# Patient Record
Sex: MALE | Race: WHITE | NOT HISPANIC OR LATINO | Employment: FULL TIME | URBAN - METROPOLITAN AREA
[De-identification: names, ages, dates, MRNs, and addresses within clinical notes are randomized per-mention and may not be internally consistent; named-entity substitution may affect disease eponyms.]

---

## 2017-01-24 ENCOUNTER — OFFICE VISIT (OUTPATIENT)
Dept: NEUROLOGY | Facility: CLINIC | Age: 77
End: 2017-01-24
Payer: COMMERCIAL

## 2017-01-24 VITALS
HEART RATE: 82 BPM | SYSTOLIC BLOOD PRESSURE: 124 MMHG | BODY MASS INDEX: 21.87 KG/M2 | WEIGHT: 165.81 LBS | DIASTOLIC BLOOD PRESSURE: 82 MMHG

## 2017-01-24 DIAGNOSIS — G91.2 NORMAL PRESSURE HYDROCEPHALUS SYNDROME: Primary | ICD-10-CM

## 2017-01-24 DIAGNOSIS — I10 ESSENTIAL HYPERTENSION: ICD-10-CM

## 2017-01-24 PROCEDURE — 1157F ADVNC CARE PLAN IN RCRD: CPT | Mod: S$GLB,,, | Performed by: PSYCHIATRY & NEUROLOGY

## 2017-01-24 PROCEDURE — 1159F MED LIST DOCD IN RCRD: CPT | Mod: S$GLB,,, | Performed by: PSYCHIATRY & NEUROLOGY

## 2017-01-24 PROCEDURE — 3074F SYST BP LT 130 MM HG: CPT | Mod: S$GLB,,, | Performed by: PSYCHIATRY & NEUROLOGY

## 2017-01-24 PROCEDURE — 1160F RVW MEDS BY RX/DR IN RCRD: CPT | Mod: S$GLB,,, | Performed by: PSYCHIATRY & NEUROLOGY

## 2017-01-24 PROCEDURE — 3079F DIAST BP 80-89 MM HG: CPT | Mod: S$GLB,,, | Performed by: PSYCHIATRY & NEUROLOGY

## 2017-01-24 PROCEDURE — 99999 PR PBB SHADOW E&M-EST. PATIENT-LVL II: CPT | Mod: PBBFAC,,, | Performed by: PSYCHIATRY & NEUROLOGY

## 2017-01-24 PROCEDURE — 99215 OFFICE O/P EST HI 40 MIN: CPT | Mod: S$GLB,,, | Performed by: PSYCHIATRY & NEUROLOGY

## 2017-01-24 PROCEDURE — 1126F AMNT PAIN NOTED NONE PRSNT: CPT | Mod: S$GLB,,, | Performed by: PSYCHIATRY & NEUROLOGY

## 2017-01-24 NOTE — PROGRESS NOTES
"A ventriculoperitoneal shunt for normal pressure hydrocephalus.  The patient's primary symptoms prior to the shunt procedure for gait disturbance, cognitive difficulty, and urinary urgency with occasional incontinence.  Following the shunt, he had done well until unfortunately he had a separate neurological event, a small cerebral hemorrhage the did not appear to be related to the shunt placement.    The patient and his wife return today indicating that since the stroke and following a period of rehabilitation for 3 weeks he was doing extremely well.  However in late December he developed a sudden sensation of weakness leaning to the left side and was unable to ambulate without help.  Since that time, he has continued to have the significant gait difficulty leaning to the left when seated.    The patient's wife indicates that he was seen by his neurosurgeon and a CT scan of the brain was done which seem to indicate the accumulation of a small subdural hematoma.  His shunt pressures were adjusted but his balance and walking did not change.  The patient has subsequent have a follow-up CT scan of the brain which seem to indicate resolution of the subdural fluid accumulation.  A follow-up CT scan also showed the cerebral hemorrhage that have been present for to be regressing in size.    The patient's wife however has become extremely frustrated because the patient continues to have periods of transient improvement and then periods of extreme worsening to the point that his gait and balance fluctuates a great deal.  He is now utilizing a walker most of the time for balance purposes.  The patient's wife is concerned because she feels that his cognitive abilities are also waxing and waning frequently.  She also feels that his "hydrocephalus symptoms" seem to come and go and fluctuate a certain extent as well.  As an example, he has developed extreme urinary frequency with extreme nocturia.  The patient seems to have " difficulty with recent recall.  The patient's wife indicates that he continues to favor his left side a great deal.  He is not had any complaint of severe headache with nausea or vomiting however.  He is not aware of any vision disturbance.  Wife indicates that they do have a follow-up visit with the neurosurgeon within the next 1-2 days.      ROS:  GENERAL: No fever, chills,  or weight loss.  SKIN: No rashes, itching or changes in color or texture of skin.  HEAD: No  recent head trauma.  EYES: Visual acuity fine. No photophobia, ocular pain or diplopia.  EARS: Denies ear pain, discharge or vertigo.  NOSE: No loss of smell, no epistaxis or postnasal drip.  MOUTH & THROAT: No hoarseness or change in voice. No excessive gum bleeding.  NODES: Denies swollen glands.  CHEST: Denies SEVERINO, cyanosis, wheezing, cough and sputum production.  CARDIOVASCULAR: Denies chest pain, PND, orthopnea or reduced exercise tolerance.  ABDOMEN: Appetite fine. No weight loss. Denies diarrhea, abdominal pain, hematemesis or blood in stool.  URINARY: No flank pain, dysuria or hematuria.  PERIPHERAL VASCULAR: No claudication or cyanosis.  MUSCULOSKELETAL: No joint stiffness or swelling. Denies back pain.  NEUROLOGIC: No history of seizures, or unexplained loss of consciousness.    The patient's past history, family history, and social history were reviewed with the patient, his wife, and the electronic medical record.  I did not have access to the follow-up CT scan brain that has been done.    PE:   VITAL SIGNS: Blood pressure 124/82, pulse 82, weight 75.2 kg  APPEARANCE: Well nourished, well developed, in no acute distress.  The patient was somewhat reserved and quiet during the visit but answered questions appropriately.  He is alert and attentive to his environment.    HEAD: Normocephalic, atraumatic.  EYES: PERRL. EOMI.  Non-icteric sclerae.    EARS: TM's intact. Light reflex normal. No retraction or perforation.    NOSE: Mucosa pink.  Airway clear.  MOUTH & THROAT: No tonsillar enlargement. No pharyngeal erythema or exudate. No stridor.  NECK: Supple. No bruits.  CHEST: Lungs clear to auscultation.  CARDIOVASCULAR: Regular rhythm without significant murmurs.  ABDOMEN: Bowel sounds normal. Not distended.  MUSCULOSKELETAL:  No bony deformity seen.    NEUROLOGIC:   Mental Status:  The patient is well oriented to person, time, place, and situation.  The patient is attentive to the environment and cooperative for the exam.  Cranial Nerves: II-XII grossly intact. Fundoscopic exam is normal.  No hemorrhage, exudate or papilledema is present. The extraocular muscles are intact in the cardinal directions of gaze.  No ptosis is present. Facial features are symmetrical.  Speech is normal in fluency, diction, and phrasing.  Tongue protrudes in the midline.    Gait and Station: The patient was able to come from sit to stand independently but had to utilize the arms of the chair to come to standing position.  He utilizes a front wheel rolling walker for ambulation.  While seated, he had a tendency to lean to the left.  While standing he also had a tendency to lean to the left.  Motor:  No downdrift of either arm when held at shoulder level.  Manual muscle testing of proximal and distal muscles of both upper and lower extremities does not indicate a focal or lateralized motor weakness in either upper or either lower extremity.  Sensory:  Intact both upper and lower extremities to pin prick, touch, and vibration.  Cerebellar:  Finger to nose done well.  However, the patient needed frequent verbal cues to complete the task.  Alternating movements seem more awkward on the left than on the right.  No involuntary movements or tremor seen.  Reflexes:  Stretch reflexes are trace both upper and lower extremities.  Plantar stimulation is flexor bilaterally and no pathological reflexes are seen      ASSESSMENT:  1.  Normal pressure hydrocephalus  2.  History of small  intracerebral bleed    DISCUSSION:  The patient's examination today does she seem to indicate the patient is slightly slower in his answers but is able to follow commands without difficulty although does require some verbal reminders to complete sequential tasks.  His ambulation is independent with his walker.  I do not think that I can prescribe the symptoms to one hour the other of the pathologic processes that is present.  Certainly having his pressures checked again and perhaps adjustment of his shunt would be of some benefit.  A follow-up CT scan to make certain that there is no further accumulation of subdural fluid is also reasonable.  The patient does have a follow-up visit with neurosurgery.  The wife is expressing increasing frustration with the turn of events that occurred.  I have tried to reassure her that the serial hemorrhage will resolve and is doing so and would probably leave little or no significant neurologic deficits.  The patient and his wife will return as needed.    This was a 70 minute visit with the patient and his wife with over 50% of time spent counseling the wife and answering questions that she had regarding the neurologic exam as well as the history.

## 2017-01-30 ENCOUNTER — TELEPHONE (OUTPATIENT)
Dept: NEUROLOGY | Facility: CLINIC | Age: 77
End: 2017-01-30

## 2017-01-30 NOTE — TELEPHONE ENCOUNTER
----- Message from Danni Grossman sent at 1/30/2017  1:44 PM CST -----  Contact: pt wife Isabelle   pt wife Isabelle, is requesting for nurse to call her back about the disability date, please call pt ASAP

## 2017-03-17 ENCOUNTER — OFFICE VISIT (OUTPATIENT)
Dept: OPHTHALMOLOGY | Facility: CLINIC | Age: 77
End: 2017-03-17
Payer: COMMERCIAL

## 2017-03-17 DIAGNOSIS — E11.9 TYPE II OR UNSPECIFIED TYPE DIABETES MELLITUS WITHOUT MENTION OF COMPLICATION, NOT STATED AS UNCONTROLLED: Primary | ICD-10-CM

## 2017-03-17 DIAGNOSIS — H40.009 PREGLAUCOMA, UNSPECIFIED: ICD-10-CM

## 2017-03-17 PROCEDURE — 99999 PR PBB SHADOW E&M-EST. PATIENT-LVL II: CPT | Mod: PBBFAC,,, | Performed by: OPHTHALMOLOGY

## 2017-03-17 PROCEDURE — 92014 COMPRE OPH EXAM EST PT 1/>: CPT | Mod: S$GLB,,, | Performed by: OPHTHALMOLOGY

## 2017-03-17 NOTE — MR AVS SNAPSHOT
[facility]  [address] [place] [address]  Phone: [phone]  Fax: [phone]                  Naun Menendez Jr.   3/17/2017 2:00 PM   Office Visit    Description:  Male : 1940   Provider:  GEMA Hicks MD   Department:  [facility]           Reason for Visit     Diabetic Eye Exam           Diagnoses this Visit        Comments    Type II or unspecified type diabetes mellitus without mention of complication, not stated as uncontrolled    -  Primary            To Do List           Goals (5 Years of Data)     None      [facility]     [facility] Nurse Care [facility] -  Assistance  Registered nurses in the [facility] Center provide clinical advisement, health education, appointment booking, and other advisory services.  Call for this free service at [phone].             Medications           Message regarding Medications     Verify the changes and/or additions to your medication regime listed below are the same as discussed with your clinician today.  If any of these changes or additions are incorrect, please notify your healthcare provider.             Verify that the below list of medications is an accurate representation of the medications you are currently taking.  If none reported, the list may be blank. If incorrect, please contact your healthcare provider. Carry this list with you in case of emergency.           Current Medications     blood sugar diagnostic, drum (ACCU-CHEK COMPACT TEST) Strp     BREO ELLIPTA 200-25 mcg/dose DsDv diskus inhaler     clobetasol (TEMOVATE) 0.05 % cream     fluticasone (FLONASE) 50 mcg/actuation nasal spray     glimepiride (AMARYL) 1 MG tablet     indapamide (LOZOL) 1.25 MG Tab Take 1.25 mg by mouth.    lancets (ACCU-CHEK SOFTCLIX LANCETS) Misc     metformin (GLUCOPHAGE) 500 MG tablet Take 500 mg by mouth 2 (two) times daily with meals.    metformin (GLUCOPHAGE-XR) 500 MG 24 hr tablet     metoprolol succinate  (TOPROL-XL) 100 MG 24 hr tablet     metoprolol succinate (TOPROL-XL) 50 MG 24 hr tablet 100 mg 2 (two) times daily.     nebivolol (BYSTOLIC) 10 MG Tab Take 10 mg by mouth once daily.    olopatadine (PATANOL) 0.1 % ophthalmic solution     omeprazole (PRILOSEC) 20 MG capsule     omeprazole (PRILOSEC) 40 MG capsule     propranolol (INDERAL LA) 120 MG 24 hr capsule Take 120 mg by mouth once daily.    propranolol (INDERAL LA) 120 MG 24 hr capsule     SYNTHROID 100 mcg tablet     testosterone cypionate (DEPOTESTOTERONE CYPIONATE) 200 mg/mL injection INJECT 260 MG. (1.3 MLS) EVERY 10 DAYS    valsartan (DIOVAN) 80 MG tablet Take 80 mg by mouth.           Clinical Reference Information           Allergies as of 3/17/2017     Methylene Blue    Pioglitazone    Streptomycin      Immunizations Administered on Date of Encounter - 3/17/2017     None      [facility] Sign-Up     Activating your [facility] account is as easy as 1-2-3!     1) Visit [email or web address], select Sign Up Now, enter this activation code and your date of birth, then select Next.  Activation code not generated  Current Patient Portal Status: Account disabled      2) Create a username and password to use when you visit [facility] in the future and select a security question in case you lose your password and select Next.    3) Enter your e-mail address and click Sign Up!    Additional Information  If you have questions, please e-mail [email or web address] or call [phone] to talk to our [facility] staff. Remember, [facility] is NOT to be used for urgent needs. For medical emergencies, dial 911.         Language Assistance Services     ATTENTION: Language assistance services are available, free of charge. Please call [phone].      ATENCIÓN: Si habla gaurang, tiene a dueñas disposición servicios gratuitos de asistencia lingüística. Llame al [phone].     CHÚ Ý: N?u b?n nói Ti?ng Vi?t, có các d?ch v? h? tr? ngôn ng? mi?n phí dành cho b?n. G?i s?  [phone].         [facility] complies with applicable Federal civil rights laws and does not discriminate on the basis of race, color, national origin, age, disability, or sex.

## 2017-03-17 NOTE — PROGRESS NOTES
===============================  Naun Menendez Jr.,   76 y.o. male   Last visit [facility]: :4/12/2016   Last visit eye dept. Visit date not found  [facility]:  Uncorrected distance visual acuity was 20/30 in the right eye and 20/30 in the left eye.  Tonometry     Tonometry (Applanation, 2:32 PM)      Right Left   Pressure 11 13                  Not recorded         Not recorded        Chief Complaint   Patient presents with    Diabetic Eye Exam     pt states vision is good        HPI     Diabetic Eye Exam    Additional comments: pt states vision is good           Comments   DM SINCE 2011  S. COAG DX 2009 based on increased c/d 0.6 / 0.55  IOP good  CCT +2  GOCT 12/30/13 normal  1+ NS OU  Hydrocephalus syndrome       Last edited by RADHA Caba on 3/17/2017  2:31 PM. (History)      Read Studies:   Vitals    ________________  3/17/2017  1. Type II or unspecified type diabetes mellitus without mention of complication, not stated as uncontrolled    2. Preglaucoma, unspecified      No DR  Good iop  rtc 1 year.       ===========================

## 2017-04-03 ENCOUNTER — TELEPHONE (OUTPATIENT)
Dept: NEUROLOGY | Facility: CLINIC | Age: 77
End: 2017-04-03

## 2017-04-03 NOTE — TELEPHONE ENCOUNTER
Pt states he was told he could start some anti inflammatory medication/ he wants to run this by you first/ he would like to/ please advise/

## 2017-04-03 NOTE — TELEPHONE ENCOUNTER
----- Message from Kasia Sanderson sent at 4/3/2017  4:22 PM CDT -----  Contact: pt  Pt would like to speak to Dr Darnell or the nurse.....[phone]

## 2017-04-10 ENCOUNTER — TELEPHONE (OUTPATIENT)
Dept: NEUROLOGY | Facility: CLINIC | Age: 77
End: 2017-04-10

## 2017-04-10 RX ORDER — DICLOFENAC SODIUM 10 MG/G
2 GEL TOPICAL DAILY
Qty: 100 G | Refills: 6 | Status: SHIPPED | OUTPATIENT
Start: 2017-04-10

## 2017-04-10 NOTE — TELEPHONE ENCOUNTER
----- Message from Jean Darnell MD sent at 4/10/2017 10:35 AM CDT -----  Contact: Pt   We do not have any current/recent lab results to report.  ----- Message -----     From: Flory Arizmendi MA     Sent: 4/10/2017  10:13 AM       To: Jean Darnell MD     Pt looking for lab results? Please advise  ----- Message -----     From: Isabelle De Leon     Sent: 4/7/2017   4:31 PM       To: [facility] Staff    Pt called and stated he needed to speak to the nurse. He stated he needs his lab results. He can be reached at [phone] (home) [phone] (work).    Thanks,  ERLIN GE

## 2017-07-24 ENCOUNTER — OFFICE VISIT (OUTPATIENT)
Dept: NEUROLOGY | Facility: CLINIC | Age: 77
End: 2017-07-24
Payer: COMMERCIAL

## 2017-07-24 VITALS
BODY MASS INDEX: 20.83 KG/M2 | SYSTOLIC BLOOD PRESSURE: 140 MMHG | HEIGHT: 73 IN | HEART RATE: 66 BPM | DIASTOLIC BLOOD PRESSURE: 80 MMHG | WEIGHT: 157.19 LBS

## 2017-07-24 DIAGNOSIS — G91.2 NORMAL PRESSURE HYDROCEPHALUS SYNDROME: Primary | ICD-10-CM

## 2017-07-24 PROCEDURE — 1159F MED LIST DOCD IN RCRD: CPT | Mod: S$GLB,,, | Performed by: PSYCHIATRY & NEUROLOGY

## 2017-07-24 PROCEDURE — 99215 OFFICE O/P EST HI 40 MIN: CPT | Mod: S$GLB,,, | Performed by: PSYCHIATRY & NEUROLOGY

## 2017-07-24 PROCEDURE — 1126F AMNT PAIN NOTED NONE PRSNT: CPT | Mod: S$GLB,,, | Performed by: PSYCHIATRY & NEUROLOGY

## 2017-07-24 PROCEDURE — 99999 PR PBB SHADOW E&M-EST. PATIENT-LVL III: CPT | Mod: PBBFAC,,, | Performed by: PSYCHIATRY & NEUROLOGY

## 2017-07-24 RX ORDER — COLESTIPOL HYDROCHLORIDE 5 G/5G
5 GRANULE, FOR SUSPENSION ORAL
COMMUNITY

## 2017-07-24 RX ORDER — FINASTERIDE 5 MG/1
5 TABLET, FILM COATED ORAL
COMMUNITY
Start: 2017-02-09

## 2017-07-24 NOTE — PROGRESS NOTES
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  The patient has a history of normal pressure hydrocephalus as well as his diabetes.    The patient's wife indicates that in May, he had a revision of his shunt.  This occurred because he had had a significant deterioration in his neurological functioning after he had had a fall striking his head.  The patient's wife had noted a significant deterioration in his gait as well as memory function and subsequent evaluation of the shunt had indicated a malfunction.  When this was revised in May, the patient's wife indicates he began to show signs of improvement.  Most noticeably, she noticed an improvement in his ambulation.  However, after that he also had another fall landing on his buttocks and against the wall striking his back which caused a compression fracture of L2 with a prior L3 compression fracture treated with kyphoplasty.  He is now returned to physical therapy 3 times a week.    The patient's wife is concerned because of significant cognitive functioning changes that have occurred.  She reports that he is showing some signs of sundowning.  He becomes confused in his own home indicating that he was to go home when in fact he is already at home.  He at times is not able to recall the day of the week or the month.  These cognitive changes are particular concern to the patient's wife.    In addition he is also having more episodes of urinary incontinence occasionally accompanied by urgency but other times just simply voiding unexpectedly.  The patient's wife states that occasionally he is not aware that he has voided involuntarily.  He is not had any fecal incontinence.      ROS:  GENERAL: No fever, chills, or weight loss.  SKIN: No rashes, itching or changes in color or texture of skin.  HEAD: The patient denies any headache at this time.  EYES: Visual acuity fine. No  photophobia, ocular pain or diplopia.  EARS: Denies ear pain, discharge or vertigo.  NOSE: No loss of smell, no epistaxis or postnasal drip.  MOUTH & THROAT: No hoarseness or change in voice. No excessive gum bleeding.  NODES: Denies swollen glands.  CHEST: Denies SEVERINO, cyanosis, wheezing, cough and sputum production.  CARDIOVASCULAR: Denies chest pain, PND, orthopnea or reduced exercise tolerance.  ABDOMEN: Appetite fine. No weight loss. Denies diarrhea, abdominal pain, hematemesis or blood in stool.  URINARY: No flank pain, dysuria or hematuria.  See also comments in present illness.  PERIPHERAL VASCULAR: No claudication or cyanosis.  MUSCULOSKELETAL: No joint stiffness or swelling. Denies back pain.  NEUROLOGIC: No history of seizures, or unexplained loss of consciousness.    The patient's past history, family history, social history have been reviewed within the electronic medical record and with the patient and his wife.    PE:   VITAL SIGNS: Blood pressure 140/80, pulse 66, weight 71.3 kg, height 6 foot 1 inch, BMI 20.74  APPEARANCE: Well nourished, well developed, in no acute distress.  He is not experiencing any headache at this time.    HEAD: Normocephalic, atraumatic.  EYES: PERRL. EOMI.  Non-icteric sclerae.    EARS: TM's intact. Light reflex normal. No retraction or perforation.    NOSE: Mucosa pink. Airway clear.  MOUTH & THROAT: No tonsillar enlargement. No pharyngeal erythema or exudate. No stridor.  NECK: Supple. No bruits.  CHEST: Lungs clear to auscultation.  CARDIOVASCULAR: Regular rhythm without significant murmurs.  MUSCULOSKELETAL:  No bony deformity seen.  Muscle tone and muscle mass are normal both upper and both lower extremities.  NEUROLOGIC:   Mental Status:   The patient is attentive to the environment and cooperative for the exam.  The patient has much less spontaneous speech than what I've seen in this patient in the past.  He was oriented to person and place and greeted the examiner  appropriately.  He is able to follow one-step commands.  Cranial Nerves: II-XII grossly intact. Fundoscopic exam is normal.  No hemorrhage, exudate or papilledema is present. The extraocular muscles are intact in the cardinal directions of gaze.  No ptosis is present. Facial features are symmetrical.  Speech is normal in fluency, diction, and phrasing.  Tongue protrudes in the midline.    Gait and Station: The patient is able to come from sit to stand independently.  He utilizes a single-point cane for ambulation.  His ambulation is wide based and very slow with occasional shuffling small steps.  To make 180° turn requires multiple steps as well as touching the wall for reassurance.  Motor:  No downdrift of either arm when held at shoulder level.  Manual muscle testing of proximal and distal muscles of both upper and lower extremities is normal.   Sensory:  Intact both upper and lower extremities to pin prick, touch, and vibration.  Cerebellar:  Finger to nose done well.  Alternating movements intact.  No involuntary movements or tremor seen.  Reflexes:  Stretch reflexes are 1+ both upper and lower extremities.  Plantar stimulation is flexor bilaterally and no pathological reflexes are seen      ASSESSMENT:  1.  Syndrome of normal pressure hydrocephalus with cognitive deterioration and gait change    DISCUSSION:  The patient has apparently had a significant change in his gait as well as in cognitive functioning.  During today's visit, he was appropriate with his comments that had much less spontaneous speech than I've heard in the past.  He is able to follow commands consistently however.  From the wife's history, there is a progression of his cognitive impairment however.  The patient's wife has a tendency to focus on the pressure figures that have been generated by neurosurgical evaluation.  She is expressing appropriate concern over the behavioral and cognitive deterioration.  We did discuss with the patient and  his wife medications, but these medications I think are of limited to no benefit in this situation.  Medications discussed specifically wore donepezil, memantine, and Exelon.  The patient's wife will present him to the neurosurgeon for another check on the shunt and will continue his physical therapy 3 times a week.    This was a 50 minute visit with the patient and his wife with over 50% of time spent counseling the patient's wife regarding the syndrome of normal pressure hydrocephalus with cognitive changes as well as gait changes.      This note is generated with speech recognition software and is subject to transcription error and sound alike phrases that may be missed by proofreading.

## 2017-11-20 ENCOUNTER — TELEPHONE (OUTPATIENT)
Dept: NEUROLOGY | Facility: CLINIC | Age: 77
End: 2017-11-20

## 2017-11-20 NOTE — TELEPHONE ENCOUNTER
----- Message from Brian Mendez sent at 11/17/2017  3:04 PM CST -----  Contact: pt wife miguel  Pt is calling nurse staff regarding an Up date form for more continue leave. Pt call back [phone] thanks

## 2017-11-20 NOTE — TELEPHONE ENCOUNTER
Spoke with Isabelle who stated that forms will be faxed over this week to extend patient's leave to February. She stated that he is doing much better but would like to know if Dr Darnell needs to see patient or talk to her before filling out form. I informed patient that Dr Darnell is out until Monday. Patient requested for Flory to call her. I advised her that Flory is currently out of the office as well. She stated that forms will be sent sometime this week and needs to be completed before the end of November.

## 2017-11-27 ENCOUNTER — TELEPHONE (OUTPATIENT)
Dept: NEUROLOGY | Facility: CLINIC | Age: 77
End: 2017-11-27

## 2017-11-27 NOTE — TELEPHONE ENCOUNTER
Advised patient's wife that forms have not been received and that our fax machine is not working. Asked patient to bring a copy to the office. She stated that she will see if she can get the forms printed.

## 2017-11-27 NOTE — TELEPHONE ENCOUNTER
----- Message from Danni Grossman sent at 11/27/2017  1:24 PM CST -----  Contact: pt   Pt wife calling to see if office received a form from the disability office ,form needs to be in this week ,, please call her back at [phone]

## 2017-11-29 ENCOUNTER — TELEPHONE (OUTPATIENT)
Dept: NEPHROLOGY | Facility: CLINIC | Age: 77
End: 2017-11-29

## 2017-11-29 NOTE — TELEPHONE ENCOUNTER
----- Message from Lilly Valentin sent at 11/28/2017 10:00 AM CST -----  Contact: Isabelle/wife  Patient request a call back at [phone], Regards to his disability form that needs to be renew before the month is over.    Thanks  td

## 2017-12-12 ENCOUNTER — TELEPHONE (OUTPATIENT)
Dept: NEUROLOGY | Facility: CLINIC | Age: 77
End: 2017-12-12

## 2017-12-12 NOTE — TELEPHONE ENCOUNTER
----- Message from Malachi Dai sent at 12/12/2017 10:16 AM CST -----  Contact: Pt/Wife  Please give pt wife a call at ..[phone] (home) [phone] (work)  regarding the pt ER visit.

## 2017-12-12 NOTE — TELEPHONE ENCOUNTER
Left message at home number that I was returning a call/ called work or number #2 Mr. Menendez said he went tot he hospital he couldn't walk well. Poor balance. They just  Let him go home, he wanted to know if there was anything you could suggest or help him with? He would like a call back from [employer] at [phone]

## 2017-12-13 ENCOUNTER — TELEPHONE (OUTPATIENT)
Dept: NEUROLOGY | Facility: CLINIC | Age: 77
End: 2017-12-13

## 2017-12-13 NOTE — TELEPHONE ENCOUNTER
Called [facility] spoke to Edward,he wanted to give you an update on pts visit today.    He wanted to clarify it was ok to continue therapy. Per  he stated yes.

## 2017-12-13 NOTE — TELEPHONE ENCOUNTER
----- Message from Angi Grubbs sent at 12/13/2017  3:39 PM CST -----  Contact: ANDREW Kraus / Brendon  Caller request call back to update doctor on pt's last appointment. [phone]

## 2018-06-05 ENCOUNTER — TELEPHONE (OUTPATIENT)
Dept: NEUROLOGY | Facility: CLINIC | Age: 78
End: 2018-06-05

## 2018-06-05 NOTE — TELEPHONE ENCOUNTER
"Hip replacement march 7/had HH/ still in outpatient PT/ pt keeps falling/the last time a few weeks ago he hit his head Janene did a CT and it was fine/ he is now having headaches/most of the time its riding in the car or any kind of motion/more and more frequent/ MsErvin Isabelle would like to talk to you at [phone] she said he would feel better if he spoke to you/ he is getting disoriented and says something like "i would like to go home" and he will be home...   "

## 2018-06-05 NOTE — TELEPHONE ENCOUNTER
----- Message from Vickie Cardenas sent at 6/5/2018  2:40 PM CDT -----  Contact: Shane Walton -[phone]   Would like to consult with the nurse about changes in patient,  Please call back at [phone].  Thx-

## 2019-03-04 ENCOUNTER — OFFICE VISIT (OUTPATIENT)
Dept: OPHTHALMOLOGY | Facility: CLINIC | Age: 79
End: 2019-03-04
Payer: COMMERCIAL

## 2019-03-04 DIAGNOSIS — E11.9 TYPE 2 DIABETES MELLITUS WITHOUT COMPLICATION, WITHOUT LONG-TERM CURRENT USE OF INSULIN: Primary | ICD-10-CM

## 2019-03-04 PROCEDURE — 99999 PR PBB SHADOW E&M-EST. PATIENT-LVL II: CPT | Mod: PBBFAC,,, | Performed by: OPHTHALMOLOGY

## 2019-03-04 PROCEDURE — 92014 PR EYE EXAM, EST PATIENT,COMPREHESV: ICD-10-PCS | Mod: S$GLB,,, | Performed by: OPHTHALMOLOGY

## 2019-03-04 PROCEDURE — 92014 COMPRE OPH EXAM EST PT 1/>: CPT | Mod: S$GLB,,, | Performed by: OPHTHALMOLOGY

## 2019-03-04 PROCEDURE — 92134 CPTRZ OPH DX IMG PST SGM RTA: CPT | Mod: S$GLB,,, | Performed by: OPHTHALMOLOGY

## 2019-03-04 PROCEDURE — 99999 PR PBB SHADOW E&M-EST. PATIENT-LVL II: ICD-10-PCS | Mod: PBBFAC,,, | Performed by: OPHTHALMOLOGY

## 2019-03-04 PROCEDURE — 92134 POSTERIOR SEGMENT OCT RETINA (OCULAR COHERENCE TOMOGRAPHY)-BOTH EYES: ICD-10-PCS | Mod: S$GLB,,, | Performed by: OPHTHALMOLOGY

## 2019-03-04 RX ORDER — PRAVASTATIN SODIUM 20 MG/1
20 TABLET ORAL
COMMUNITY
Start: 2019-01-09 | End: 2020-01-09

## 2019-03-04 NOTE — PROGRESS NOTES
===============================  03/04/2019   Naun Menendez Jr.,   78 y.o. male   Last visit [facility]: :3/17/2017   Last visit eye dept. Visit date not found  [facility]:  Uncorrected distance visual acuity was 20/40 in the right eye and 20/40 in the left eye.  Tonometry     Tonometry (Applanation, 3:07 PM)       Right Left    Pressure 16 15               Not recorded        Manifest Refraction     Manifest Refraction       Sphere Dist [facility]    Right -0.50 20/30    Left +1.00 20/30              Chief Complaint   Patient presents with    Diabetic Eye Exam     here for dm eye exam, sees well as long as he has his readers    Glaucoma Suspect        HPI     Diabetic Eye Exam      Additional comments: here for dm eye exam, sees well as long as he has   his readers              Comments     DM SINCE 2011  S. COAG DX 2009 based on increased c/d 0.6 / 0.55  IOP good  CCT +2  GOCT 12/30/13 normal  1+ NS OU  Hydrocephalus syndrome          Last edited by RADHA Caba on 3/4/2019  2:50 PM. (History)          ________________  3/4/2019  Problem List Items Addressed This Visit        Eye/Vision problems    Diabetes mellitus - Primary    Relevant Orders    Posterior Segment OCT Retina-Both eyes (Completed)      no active dbr  Dry smd  Good iop   rtc 1yr           ===========================

## 2022-01-25 NOTE — PROGRESS NOTES
===============================  Date today is 1/31/2022  Naun Menendez Jr. is a 81 y.o. male  Last visit [facility]: :3/4/2019   Last visit eye dept. Visit date not found    Uncorrected distance visual acuity was 20/50 in the right eye and 20/50 in the left eye.  Tonometry     Tonometry (Applanation, 3:27 PM)       Right Left    Pressure 18 18              Not recorded       Manifest Refraction     Manifest Refraction       Sphere Dist [facility]    Right      Left +1.00 20/40              Not recorded       Chief Complaint   Patient presents with    Diabetes     Yearly diabetic exam       Problem List Items Addressed This Visit    None     Visit Diagnoses     Controlled type 2 diabetes mellitus without complication, without long-term current use of insulin    -  Primary    Relevant Orders    Posterior Segment OCT Retina-Both eyes (Completed)    Diabetic cataract of both eyes        Bilateral dry eyes        RPE mottling of macula        Relevant Orders    Posterior Segment OCT Retina-Both eyes (Completed)          ________________  1/31/2022 today    2+ ns 1+ cortical  Mild mod cataracts  Macula ok cd 0.6 rnfl ok  rec ats qid 2 weeks then titrate back   mr humphreys   .rtc 1 year  DM No bdr  RPE mottling OU- OCT stable      RTC 1 year  Instructed to call 24/7 for any worsening of vision or symptoms. Check OU daily.   Gave my office and cell phone number.      ===============================

## 2022-01-31 ENCOUNTER — OFFICE VISIT (OUTPATIENT)
Dept: OPHTHALMOLOGY | Facility: CLINIC | Age: 82
End: 2022-01-31
Payer: MEDICARE

## 2022-01-31 DIAGNOSIS — E11.36 DIABETIC CATARACT OF BOTH EYES: ICD-10-CM

## 2022-01-31 DIAGNOSIS — H04.123 BILATERAL DRY EYES: ICD-10-CM

## 2022-01-31 DIAGNOSIS — E11.9 CONTROLLED TYPE 2 DIABETES MELLITUS WITHOUT COMPLICATION, WITHOUT LONG-TERM CURRENT USE OF INSULIN: Primary | ICD-10-CM

## 2022-01-31 DIAGNOSIS — H35.89 RPE MOTTLING OF MACULA: ICD-10-CM

## 2022-01-31 PROCEDURE — 92134 POSTERIOR SEGMENT OCT RETINA (OCULAR COHERENCE TOMOGRAPHY)-BOTH EYES: ICD-10-PCS | Mod: S$GLB,,, | Performed by: OPHTHALMOLOGY

## 2022-01-31 PROCEDURE — 99999 PR PBB SHADOW E&M-EST. PATIENT-LVL IV: CPT | Mod: PBBFAC,,, | Performed by: OPHTHALMOLOGY

## 2022-01-31 PROCEDURE — 92134 CPTRZ OPH DX IMG PST SGM RTA: CPT | Mod: PBBFAC | Performed by: OPHTHALMOLOGY

## 2022-01-31 PROCEDURE — 92014 PR EYE EXAM, EST PATIENT,COMPREHESV: ICD-10-PCS | Mod: S$GLB,,, | Performed by: OPHTHALMOLOGY

## 2022-01-31 PROCEDURE — 99214 OFFICE O/P EST MOD 30 MIN: CPT | Mod: PBBFAC | Performed by: OPHTHALMOLOGY

## 2022-01-31 PROCEDURE — 99999 PR PBB SHADOW E&M-EST. PATIENT-LVL IV: ICD-10-PCS | Mod: PBBFAC,,, | Performed by: OPHTHALMOLOGY

## 2022-01-31 PROCEDURE — 92014 COMPRE OPH EXAM EST PT 1/>: CPT | Mod: S$GLB,,, | Performed by: OPHTHALMOLOGY

## 2022-01-31 RX ORDER — ISOSORBIDE MONONITRATE 60 MG/1
1 TABLET, EXTENDED RELEASE ORAL DAILY
COMMUNITY
Start: 2021-04-16 | End: 2022-04-16

## 2022-01-31 RX ORDER — ASCORBIC ACID 500 MG
500 TABLET ORAL
COMMUNITY

## 2022-01-31 RX ORDER — ASPIRIN 81 MG/1
1 TABLET ORAL DAILY
COMMUNITY
Start: 2021-02-14 | End: 2022-02-14

## 2022-01-31 RX ORDER — CHOLECALCIFEROL (VITAMIN D3) 50 MCG
1000 TABLET ORAL
COMMUNITY

## 2022-01-31 RX ORDER — LISINOPRIL 5 MG/1
1 TABLET ORAL 2 TIMES DAILY
COMMUNITY
Start: 2021-11-01

## 2022-01-31 RX ORDER — UBIDECARENONE 30 MG
100 CAPSULE ORAL
COMMUNITY

## 2022-01-31 RX ORDER — LANOLIN ALCOHOL/MO/W.PET/CERES
1000 CREAM (GRAM) TOPICAL
COMMUNITY

## 2022-01-31 RX ORDER — POTASSIUM CHLORIDE 750 MG/1
1 TABLET, EXTENDED RELEASE ORAL DAILY
COMMUNITY
Start: 2021-11-18

## 2022-01-31 RX ORDER — NITROGLYCERIN 0.4 MG/1
0.4 TABLET SUBLINGUAL
COMMUNITY
Start: 2021-11-24 | End: 2022-05-23